# Patient Record
Sex: FEMALE | Race: WHITE | HISPANIC OR LATINO | URBAN - METROPOLITAN AREA
[De-identification: names, ages, dates, MRNs, and addresses within clinical notes are randomized per-mention and may not be internally consistent; named-entity substitution may affect disease eponyms.]

---

## 2024-07-09 ENCOUNTER — OFFICE VISIT (OUTPATIENT)
Dept: URGENT CARE | Facility: CLINIC | Age: 64
End: 2024-07-09
Payer: COMMERCIAL

## 2024-07-09 VITALS
SYSTOLIC BLOOD PRESSURE: 159 MMHG | DIASTOLIC BLOOD PRESSURE: 96 MMHG | HEART RATE: 84 BPM | HEIGHT: 62 IN | BODY MASS INDEX: 23.92 KG/M2 | OXYGEN SATURATION: 97 % | TEMPERATURE: 97.5 F | RESPIRATION RATE: 14 BRPM | WEIGHT: 130 LBS

## 2024-07-09 DIAGNOSIS — L24.7 IRRITANT CONTACT DERMATITIS DUE TO PLANTS, EXCEPT FOOD: Primary | ICD-10-CM

## 2024-07-09 DIAGNOSIS — L03.90 CELLULITIS, UNSPECIFIED CELLULITIS SITE: ICD-10-CM

## 2024-07-09 PROCEDURE — 1159F MED LIST DOCD IN RCRD: CPT

## 2024-07-09 PROCEDURE — 1036F TOBACCO NON-USER: CPT

## 2024-07-09 PROCEDURE — 99204 OFFICE O/P NEW MOD 45 MIN: CPT

## 2024-07-09 PROCEDURE — 1126F AMNT PAIN NOTED NONE PRSNT: CPT

## 2024-07-09 RX ORDER — LANOLIN ALCOHOL/MO/W.PET/CERES
1000 CREAM (GRAM) TOPICAL
COMMUNITY
Start: 2023-12-19

## 2024-07-09 RX ORDER — PREDNISONE 20 MG/1
TABLET ORAL 3 TIMES DAILY
Qty: 30 TABLET | Refills: 0 | Status: SHIPPED | OUTPATIENT
Start: 2024-07-09 | End: 2024-07-24

## 2024-07-09 RX ORDER — PREDNISONE 10 MG/1
TABLET ORAL
COMMUNITY

## 2024-07-09 RX ORDER — SULFAMETHOXAZOLE AND TRIMETHOPRIM 800; 160 MG/1; MG/1
1 TABLET ORAL 2 TIMES DAILY
Qty: 10 TABLET | Refills: 0 | Status: SHIPPED | OUTPATIENT
Start: 2024-07-09 | End: 2024-07-14

## 2024-07-09 RX ORDER — CITALOPRAM 10 MG/1
10 TABLET ORAL
COMMUNITY
Start: 2023-12-18 | End: 2024-09-24

## 2024-07-09 ASSESSMENT — ENCOUNTER SYMPTOMS
CHEST TIGHTNESS: 0
NAUSEA: 0
DIARRHEA: 0
WHEEZING: 0
SHORTNESS OF BREATH: 0
DIZZINESS: 0
HEADACHES: 0
JOINT SWELLING: 0
ABDOMINAL PAIN: 0
ARTHRALGIAS: 0
VOMITING: 0
CHILLS: 0
MYALGIAS: 0
WEAKNESS: 0
BRUISES/BLEEDS EASILY: 0
FEVER: 0

## 2024-07-09 ASSESSMENT — PAIN SCALES - GENERAL: PAINLEVEL: 0-NO PAIN

## 2024-07-09 NOTE — LETTER
July 9, 2024     Patient: Sophie David   YOB: 1960   Date of Visit: 7/9/2024       To Whom It May Concern:    It is my medical opinion that Sophie David may return to work on 7/11/24 .    If you have any questions or concerns, please don't hesitate to call.         Sincerely,        Jaz Riley, TEN-CNP    CC: No Recipients

## 2024-07-09 NOTE — PROGRESS NOTES
Subjective   Patient ID: Sophie David is a 64 y.o. female.    Patient presents to urgent care for complaints of worsening poison ivy.  Patient states that on 7/4/2024 she was outside doing yard work and pulling blair off of a tree when she did not realize there was poison ivy all over.  Patient states that on 7/6/2024 she went to a urgent care in [place] and was given Solu-Medrol in office as well as a tapered dose of prednisone, however the redness, swelling and rash has gotten increasingly worse.  Patient denies any known fevers, chills, shortness of breath, difficulty breathing, chest pain or any other systemic complaints.  Patient states that she has been taking the prednisone as prescribed as well as using Benadryl which does seem to help with the intense itching.  Patient denies any prior history of skin infections.  Patient states that she did try to get in with her PCP however they said that they could not get her until August.    Review of Systems   Constitutional:  Negative for chills and fever.   Respiratory:  Negative for chest tightness, shortness of breath and wheezing.    Cardiovascular:  Negative for chest pain.   Gastrointestinal:  Negative for abdominal pain, diarrhea, nausea and vomiting.   Musculoskeletal:  Negative for arthralgias, joint swelling and myalgias.   Skin:  Positive for rash (Across both arms and legs).   Neurological:  Negative for dizziness, weakness and headaches.   Hematological:  Does not bruise/bleed easily.   All other systems reviewed and are negative.    Objective   Physical Exam  Constitutional:       Appearance: Normal appearance.   HENT:      Head: Normocephalic and atraumatic.      Mouth/Throat:      Mouth: Mucous membranes are moist.      Pharynx: Oropharynx is clear.   Eyes:      Extraocular Movements: Extraocular movements intact.      Conjunctiva/sclera: Conjunctivae normal.      Pupils: Pupils are equal, round, and reactive to light.   Cardiovascular:       Rate and Rhythm: Normal rate and regular rhythm.      Pulses: Normal pulses.      Heart sounds: Normal heart sounds.   Pulmonary:      Effort: Pulmonary effort is normal.      Breath sounds: Normal breath sounds.   Abdominal:      General: Abdomen is flat. Bowel sounds are normal.      Palpations: Abdomen is soft.   Musculoskeletal:         General: Swelling (Bilateral hands) present. Normal range of motion.      Cervical back: Normal range of motion and neck supple.   Skin:     General: Skin is warm and dry.      Capillary Refill: Capillary refill takes less than 2 seconds.      Findings: Erythema (Bilateral hands) and rash present. Rash is papular (Across bilateral upper and lower extremities).   Neurological:      General: No focal deficit present.      Mental Status: She is alert and oriented to person, place, and time.       I discussed with patient that I am concerned for possibly a secondary cellulitis.  Patient's prednisone dose that was previously prescribed was started out at a lower dose so I am adjusting the prednisone to begin at 60 mg a day for 5 days, 40 mg for 5 days, and then 20 mg for 5 days.  Also starting patient on Bactrim twice daily for the next 5 days to treat for cellulitis.  Discussed with patient that if she is to develop any worsening symptoms, fever, chills, worsening redness, swelling she is to proceed to the ER immediately for further evaluation.  Patient agrees and is understanding.    Assessment/Plan   Problem List Items Addressed This Visit             ICD-10-CM    Cellulitis L03.90    Relevant Medications    sulfamethoxazole-trimethoprim (Bactrim DS) 800-160 mg tablet    Irritant contact dermatitis due to plants, except food - Primary L24.7    Relevant Medications    predniSONE (Deltasone) 20 mg tablet       Patient disposition: Home